# Patient Record
Sex: MALE | Race: WHITE | NOT HISPANIC OR LATINO | Employment: OTHER | URBAN - METROPOLITAN AREA
[De-identification: names, ages, dates, MRNs, and addresses within clinical notes are randomized per-mention and may not be internally consistent; named-entity substitution may affect disease eponyms.]

---

## 2024-01-03 ENCOUNTER — OFFICE VISIT (OUTPATIENT)
Dept: PRIMARY CARE | Facility: CLINIC | Age: 84
End: 2024-01-03
Payer: MEDICARE

## 2024-01-03 VITALS
WEIGHT: 255 LBS | HEART RATE: 77 BPM | BODY MASS INDEX: 38.65 KG/M2 | SYSTOLIC BLOOD PRESSURE: 150 MMHG | DIASTOLIC BLOOD PRESSURE: 82 MMHG | OXYGEN SATURATION: 97 % | HEIGHT: 68 IN

## 2024-01-03 DIAGNOSIS — E11.40 TYPE 2 DIABETES MELLITUS WITH DIABETIC NEUROPATHY, WITHOUT LONG-TERM CURRENT USE OF INSULIN (MULTI): ICD-10-CM

## 2024-01-03 DIAGNOSIS — I10 BENIGN ESSENTIAL HYPERTENSION: Primary | ICD-10-CM

## 2024-01-03 DIAGNOSIS — E78.2 MIXED HYPERLIPIDEMIA: ICD-10-CM

## 2024-01-03 DIAGNOSIS — E13.69 OTHER SPECIFIED DIABETES MELLITUS WITH OTHER SPECIFIED COMPLICATION, UNSPECIFIED WHETHER LONG TERM INSULIN USE (MULTI): ICD-10-CM

## 2024-01-03 PROBLEM — C44.41 BASAL CELL CARCINOMA (BCC) OF SKIN OF NECK: Status: ACTIVE | Noted: 2024-01-03

## 2024-01-03 PROBLEM — F51.01 PRIMARY INSOMNIA: Status: ACTIVE | Noted: 2024-01-03

## 2024-01-03 PROBLEM — G62.9 NEUROPATHY: Status: ACTIVE | Noted: 2024-01-03

## 2024-01-03 PROBLEM — E78.5 HYPERLIPIDEMIA: Status: ACTIVE | Noted: 2024-01-03

## 2024-01-03 PROCEDURE — 99214 OFFICE O/P EST MOD 30 MIN: CPT | Performed by: EMERGENCY MEDICINE

## 2024-01-03 PROCEDURE — 1036F TOBACCO NON-USER: CPT | Performed by: EMERGENCY MEDICINE

## 2024-01-03 PROCEDURE — 3079F DIAST BP 80-89 MM HG: CPT | Performed by: EMERGENCY MEDICINE

## 2024-01-03 PROCEDURE — 3077F SYST BP >= 140 MM HG: CPT | Performed by: EMERGENCY MEDICINE

## 2024-01-03 PROCEDURE — 1126F AMNT PAIN NOTED NONE PRSNT: CPT | Performed by: EMERGENCY MEDICINE

## 2024-01-03 RX ORDER — ASPIRIN 81 MG/1
81 TABLET ORAL DAILY
COMMUNITY

## 2024-01-03 RX ORDER — METFORMIN HYDROCHLORIDE 500 MG/1
500 TABLET, EXTENDED RELEASE ORAL
COMMUNITY

## 2024-01-03 RX ORDER — ATENOLOL 25 MG/1
TABLET ORAL DAILY
COMMUNITY

## 2024-01-03 RX ORDER — INSULIN GLARGINE 100 [IU]/ML
55 INJECTION, SOLUTION SUBCUTANEOUS EVERY 24 HOURS
COMMUNITY
End: 2024-01-03 | Stop reason: SDUPTHER

## 2024-01-03 RX ORDER — GLIMEPIRIDE 4 MG/1
4 TABLET ORAL
COMMUNITY

## 2024-01-03 RX ORDER — AMLODIPINE BESYLATE 10 MG/1
TABLET ORAL DAILY
COMMUNITY

## 2024-01-03 RX ORDER — LISINOPRIL 20 MG/1
20 TABLET ORAL DAILY
COMMUNITY

## 2024-01-03 RX ORDER — SIMVASTATIN 20 MG/1
20 TABLET, FILM COATED ORAL NIGHTLY
COMMUNITY

## 2024-01-03 RX ORDER — INSULIN GLARGINE 100 [IU]/ML
55 INJECTION, SOLUTION SUBCUTANEOUS EVERY 24 HOURS
Qty: 10 ML | Refills: 3 | Status: SHIPPED | OUTPATIENT
Start: 2024-01-03

## 2024-01-03 RX ORDER — PANTOPRAZOLE SODIUM 40 MG/1
40 TABLET, DELAYED RELEASE ORAL
COMMUNITY

## 2024-01-03 NOTE — PROGRESS NOTES
Subjective   Patient ID: Narciso Dao is a 83 y.o. male who presents for Establish Care.    Assessment/Plan   Problem List Items Addressed This Visit       Benign essential hypertension - Primary    Hyperlipidemia    Type 2 diabetes mellitus (CMS/formerly [facility])     Other Visit Diagnoses       Other specified diabetes mellitus with other specified complication, unspecified whether long term insulin use (CMS/formerly [facility])              Diabetes type 2- controlled on metformin 500mg, glimepiride and insulin. Will continue the same and check A1c at next visit.     Hypertension- on atenolol 25mg, lisinopril 20mg, and Norvasc 10mg. Legs are mildly swollen bilaterally, will decrease norvasc.      Hyperlipidemia- Zocor 20mg     Labs prior to next visit     Follow up in 5 months or sooner as needed     Source of history: Nurse, Medical personnel, Medical record, Patient.  History limitation: None.    HPI  83 y.o. male here to establish [facility]   Previous  provider moved locations.   No acute complaints.     Past medical history includes diabetes type 2 with neuropathy, hypertension, hyperlipidemia, insomnia, and basal cell carcinoma.     Not on File    Current Outpatient Medications   Medication Sig Dispense Refill    amLODIPine (Norvasc) 10 mg tablet Take by mouth once daily.      aspirin 81 mg EC tablet Take 1 tablet (81 mg) by mouth once daily.      atenolol (Tenormin) 25 mg tablet Take by mouth once daily.      glimepiride (Amaryl) 4 mg tablet Take 1 tablet (4 mg) by mouth once daily in the morning. Take before meals.      insulin glargine (Lantus U-100 Insulin) 100 unit/mL injection Inject 55 Units under the skin once every 24 hours. Take as directed per insulin instructions.      lisinopril 20 mg tablet Take 1 tablet (20 mg) by mouth once daily.      magnesium hydroxide (MAGNESIA ORAL) Take by mouth.      metFORMIN  mg 24 hr tablet Take 1 tablet (500 mg) by mouth once daily in the evening. Take with meals. Do not crush, chew, or  "split.      pantoprazole (ProtoNix) 40 mg EC tablet Take 1 tablet (40 mg) by mouth once daily in the morning. Take before meals. Do not crush, chew, or split.      simvastatin (Zocor) 20 mg tablet Take 1 tablet (20 mg) by mouth once daily at bedtime.       No current facility-administered medications for this visit.       Objective   Visit Vitals  /82   Pulse 77   Ht 1.727 m (5' 8\")   Wt 116 kg (255 lb)   SpO2 97%   BMI 38.77 kg/m²   Smoking Status Former   BSA 2.36 m²     Physical Exam  Vital signs as per nursing/MA documentation   General appearance: Alert and in no acute distress  HEENT: Normal Inspection   Neck: Normal Inspection   Respiratory: No respiratory distress Lungs are clear   Cardiovascular: Heart rate normal. No gallop  Back: Normal Inspection   Skin inspection: Warm   Musculoskeletal: No deformities   Neuro: Limited exam. Baseline    Review of Systems   Comprehensive review of systems as allowed by patient condition and nursing input is negative    No visits with results within 4 Month(s) from this visit.   Latest known visit with results is:   Legacy Encounter on 02/02/2023   Component Date Value Ref Range Status    Glucose 02/02/2023 150 (H)  74 - 99 mg/dL Final    Sodium 02/02/2023 140  136 - 145 mmol/L Final    Potassium 02/02/2023 4.7  3.5 - 5.3 mmol/L Final    Chloride 02/02/2023 104  98 - 107 mmol/L Final    Bicarbonate 02/02/2023 26  21 - 32 mmol/L Final    Anion Gap 02/02/2023 15  10 - 20 mmol/L Final    Urea Nitrogen 02/02/2023 21  6 - 23 mg/dL Final    Creatinine 02/02/2023 1.48 (H)  0.50 - 1.30 mg/dL Final    GFR MALE 02/02/2023 47 (A)  >90 mL/min/1.73m2 Final    Calcium 02/02/2023 9.9  8.6 - 10.6 mg/dL Final    Hemoglobin A1C 02/02/2023 8.3 (A)  % Final    Estimated Average Glucose 02/02/2023 192  MG/DL Final       Radiology: Reviewed imaging in powerchart.  No results found.    No family history on file.  Social History     Socioeconomic History    Marital status:      " Spouse name: None    Number of children: None    Years of education: None    Highest education level: None   Occupational History    None   Tobacco Use    Smoking status: Former     Types: Cigarettes    Smokeless tobacco: Never   Substance and Sexual Activity    Alcohol use: Yes    Drug use: None    Sexual activity: None   Other Topics Concern    None   Social History Narrative    None     Social Determinants of Health     Financial Resource Strain: Not on file   Food Insecurity: Not on file   Transportation Needs: Not on file   Physical Activity: Not on file   Stress: Not on file   Social Connections: Not on file   Intimate Partner Violence: Not on file   Housing Stability: Not on file     Past Medical History:   Diagnosis Date    Other specified soft tissue disorders 05/04/2017    Nodule of soft tissue     History reviewed. No pertinent surgical history.    Scribe Attestation  By signing my name below, Rand MENDOZA Scribe   attest that this documentation has been prepared under the direction and in the presence of Nba Adasm MD.

## 2024-02-22 DIAGNOSIS — E13.69 OTHER SPECIFIED DIABETES MELLITUS WITH OTHER SPECIFIED COMPLICATION, UNSPECIFIED WHETHER LONG TERM INSULIN USE (MULTI): ICD-10-CM

## 2024-06-10 LAB
NON-UH HIE A/G RATIO: 1.1
NON-UH HIE ALB: 4 G/DL (ref 3.4–5)
NON-UH HIE ALK PHOS: 179 UNIT/L (ref 45–117)
NON-UH HIE BASO COUNT: 0.08 X1000 (ref 0–0.2)
NON-UH HIE BASOS %: 0.7 %
NON-UH HIE BILIRUBIN, TOTAL: 0.4 MG/DL (ref 0.3–1.2)
NON-UH HIE BUN/CREAT RATIO: 14.4
NON-UH HIE BUN: 23 MG/DL (ref 9–23)
NON-UH HIE CALCIUM: 10 MG/DL (ref 8.7–10.4)
NON-UH HIE CALCULATED LDL CHOLESTEROL: 27 MG/DL (ref 60–130)
NON-UH HIE CALCULATED OSMOLALITY: 285 MOSM/KG (ref 275–295)
NON-UH HIE CHLORIDE: 105 MMOL/L (ref 98–107)
NON-UH HIE CHOLESTEROL: 143 MG/DL (ref 100–200)
NON-UH HIE CO2, VENOUS: 26 MMOL/L (ref 20–31)
NON-UH HIE CREATININE: 1.6 MG/DL (ref 0.6–1.1)
NON-UH HIE DIFF?: NO
NON-UH HIE EOS COUNT: 0.15 X1000 (ref 0–0.5)
NON-UH HIE EOSIN %: 1.3 %
NON-UH HIE GFR AA: 50
NON-UH HIE GLOBULIN: 3.8 G/DL
NON-UH HIE GLOMERULAR FILTRATION RATE: 41 ML/MIN/1.73M?
NON-UH HIE GLUCOSE: 169 MG/DL (ref 74–106)
NON-UH HIE GOT: 21 UNIT/L (ref 15–37)
NON-UH HIE GPT: 21 UNIT/L (ref 10–49)
NON-UH HIE HCT: 46.4 % (ref 41–52)
NON-UH HIE HDL CHOLESTEROL: 43 MG/DL (ref 40–60)
NON-UH HIE HGB A1C: 7.8 %
NON-UH HIE HGB: 15.2 G/DL (ref 13.5–17.5)
NON-UH HIE INSTR WBC: 11.1
NON-UH HIE K: 5.3 MMOL/L (ref 3.5–5.1)
NON-UH HIE LYMPH %: 30.9 %
NON-UH HIE LYMPH COUNT: 3.43 X1000 (ref 1.2–4.8)
NON-UH HIE MCH: 28.4 PG (ref 27–34)
NON-UH HIE MCHC: 32.7 G/DL (ref 32–37)
NON-UH HIE MCV: 86.6 FL (ref 80–100)
NON-UH HIE MONO %: 7.8 %
NON-UH HIE MONO COUNT: 0.87 X1000 (ref 0.1–1)
NON-UH HIE MPV: 8.8 FL (ref 7.4–10.4)
NON-UH HIE NA: 139 MMOL/L (ref 135–145)
NON-UH HIE NEUTROPHIL %: 59.2 %
NON-UH HIE NEUTROPHIL COUNT (ANC): 6.57 X1000 (ref 1.4–8.8)
NON-UH HIE NUCLEATED RBC: 0 /100WBC
NON-UH HIE PLATELET: 307 X10 (ref 150–450)
NON-UH HIE RBC: 5.36 X10 (ref 4.7–6.1)
NON-UH HIE RDW: 14.4 % (ref 11.5–14.5)
NON-UH HIE TOTAL CHOL/HDL CHOL RATIO: 3.3
NON-UH HIE TOTAL PROTEIN: 7.8 G/DL (ref 5.7–8.2)
NON-UH HIE TRIGLYCERIDES: 363 MG/DL (ref 30–150)
NON-UH HIE TSH: 4.45 UIU/ML (ref 0.55–4.78)
NON-UH HIE WBC: 11.1 X10 (ref 4.5–11)

## 2024-06-12 ENCOUNTER — APPOINTMENT (OUTPATIENT)
Dept: DERMATOLOGY | Facility: CLINIC | Age: 84
End: 2024-06-12
Payer: MEDICARE

## 2024-06-12 DIAGNOSIS — C44.219 BASAL CELL CARCINOMA (BCC) OF SKIN OF LEFT EAR: ICD-10-CM

## 2024-06-12 NOTE — LETTER
[facility] Provider/Referral Letter Treatment Plan    Patient: Narciso Dao   YOB: 1940   Date of Visit: 6/12/2024   MRN: [id number]     Renetta Montague MD  [address] Mont[address] MD  [address]  [place] [address]    Dear Renetta Montague MD,     I had the pleasure of seeing Narciso Dao today in consultation at your request for evaluation and treatment of:  1. Basal cell carcinoma (BCC) of skin of left ear  Left Superior [place]    Mohs surgery    Staff Communication: Dermatology Local Anesthesia: 1 % Lidocaine / Epinephrine - Amount: 6cc      Mohs surgery was indicated because of the nature of the lesion and the need to obtain the highest cure rate.  After informed consent was obtained, the patient underwent the procedure without complication.    The skin cancer was removed, wound care instructions were given and the patient was advised to follow up with you.  I will see the patient post-operatively as indicated.    Thank you very much for your confidence in me and for allowing me to share in the care of this patient.    1. Basal cell carcinoma (BCC) of skin of left ear  Left Superior [place]  Is a 1.2 x 1.6 cm scar                      Mohs surgery    Consent obtained: written    Universal Protocol:  Procedure explained and questions answered to patient or proxy's satisfaction: Yes    Test results available and properly labeled: Yes    Pathology report reviewed: Yes    External notes reviewed: Yes    Photo or diagram used for site identification: Yes    Site/side marked: Yes    Slide independently reviewed by Mohs surgeon: Yes    Immediately prior to procedure a time out was called: Yes    Patient identity confirmed: verbally with patient  Preparation: Patient was prepped and draped in usual sterile fashion      Anticoagulation:  Is the patient taking prescription anticoagulant and/or aspirin prescribed/recommended by a physician? Yes    Was the anticoagulation regimen changed prior to  Mohs? No      Anesthesia:  Anesthesia method: local infiltration  Local anesthetic: lidocaine 1% WITH epi    Procedure Details:  Biopsy accession number: [id number]  Date of biopsy: 4/16/2024  Frozen section biopsy performed: No    Specimen debulked: No    Pre-Op diagnosis: basal cell carcinoma  BCC subtype: nodular  Surgery side: left  Surgical site (from skin exam): Left Superior [place]  Pre-operative length (cm): 1.2  Pre-operative width (cm): 1.6  Indications for Mohs surgery: anatomic location where tissue conservation is critical    Micrographic Surgery Details:  Post-operative length (cm): 1.8  Post-operative width (cm): 1.6  Number of Mohs stages: 1    Stage 1     Comments: The patient was brought into the operating room and placed in the procedure chair in the appropriate position.  The area positive by previous biopsy was identified and confirmed with the patient. The area of clinically obvious tumor was debulked using a curette and/or scalpel as needed. An incision was made following the Mohs approach through the skin. The specimen was taken to the lab, divided into 2 piece(s) and appropriately chromacoded and processed.                 Tumor features identified on Mohs section: no tumor identified    Depth of defect: subcutaneous fat    Patient tolerance of procedure: tolerated well, no immediate complications    Reconstruction:  Was the defect reconstructed?: No    Fine/surface layer approximation (top stitches)   Hemostasis achieved with: electrodesiccation  Outcome: patient tolerated procedure well with no complications    Post-procedure details: sterile dressing applied and wound care instructions given    Dressing type: Gelfoam, petrolatum, pressure dressing, Telfa pad and Hypafix      Staff Communication: Dermatology Local Anesthesia: 1 % Lidocaine / Epinephrine - Amount: 6cc           Sincerely,       Enio Mcwilliams MD PhD  [facility]

## 2024-06-12 NOTE — PROGRESS NOTES
Office Visit Note  Date: 6/12/2024  Surgeon:  Enio Mcwilliams MD PhD  Office Location:  [address] [facility]  [address] [facility]  [address]  [facility] [address]  Dept: [phone]  Dept Fax: [phone]  Referring Provider: Renetta Montague MD  [address]  [address]  [place] [address]    Subjective   Narciso Dao is a 83 y.o. male who presents for the following: MOHS Surgery    According to the patient, the lesion has been present for approximately 6 months at the time of diagnosis.  The lesion is Bleeding.  The lesion has not been treated previously.    The patient does not have a pacemaker / defibrillator.  The patient does not have a heart valve / joint replacement.    The patient is on blood thinners.  The patient does not have a history of hepatitis B or C.  The patient does not have a history of HIV.  The patient does not have a history of immunosuppression (e.g. organ transplantation, malignancy, medications)    Review of Systems:  No other skin or systemic complaints other than what is documented elsewhere in the note.    MEDICAL HISTORY: clinically relevant history including significant past medical history, medications and allergies was reviewed and documented in Epic.    Objective   Well appearing patient in no apparent distress; mood and affect are within normal limits.  Vital signs: See record.  Noted on the Left Superior Helix  Is a 1.2 x 1.6 cm scar                  The patient confirmed the identified site.    Discussion:  The nature of the diagnosis was explained. The lesion is a skin cancer.  It has a risk of local growth and distant spread. The condition is associated with sun exposure.  Warning signs of non-melanoma skin cancer discussed. Patient was instructed to perform monthly self skin examination.  We recommended that the patient have regular full skin exams given an increased risk of subsequent skin cancers. The patient was instructed to use sun  protective behaviors including use of broad spectrum sunscreens and sun protective clothing to reduce risk of skin cancers.      Risks, benefits, side effects of Mohs surgery were discussed with patient and the patient voiced understanding.  It was explained that even though the cure rate of Mohs is very high it is not 100%. Risks of surgery including but not limited to bleeding, infection, numbness, nerve damage, and scar were reviewed.  Discussion included wound care requirements, activity restrictions, likely scar outcome and time to heal.     After Mohs surgery, the defect may need to be repaired surgically and the scar may be longer than the original lesion.  Reconstruction options, risks, and benefits were reviewed including second intention healing, linear repair (4-1 ratio was explained), local flaps, skin grafts, cartilage grafts and interpolation flaps (the need for multiple surgeries was explained). Possible outcomes were reviewed including likely scar appearance, failure of flap survival, infection, bleeding and the need for revision surgery.     The pathology was reviewed, the photograph was reviewed, and the referring physician's note was reviewed.    Patient elected for Mohs surgery.

## 2024-06-12 NOTE — PROGRESS NOTES
Spoke with patient and he says that he has an appt for this on 6/17/24. Will discuss this further with doctor

## 2024-06-12 NOTE — PROGRESS NOTES
Mohs Surgery Operative Note    Date of Surgery:  6/12/2024  Surgeon:  Enio Mcwilliams MD PhD  Office Location: [address]  [facility] [address]  Dept: [phone]  Dept Fax: [phone]  Referring Provider: Renetta Montague MD  [address]  [address]  [place] [address]      Assessment/Plan   Pre-procedure:   Obtained informed consent: written from patient  The surgical site was identified and confirmed with the patient.     Intra-operative:   Audible time out called at : 10:54 AM 06/12/24  by: Murali Bonilla MA   Verified patient name, birthdate, site, specimen bottle label & requisition.    The planned procedure(s) was again reviewed with the patient. The risks of bleeding, infection, nerve damage and scarring were reviewed. Written authorization was obtained. The patient identity, surgical site, and planned procedure(s) were verified. The provider acted as both surgeon and pathologist.     Basal cell carcinoma (BCC) of skin of left ear  Left Superior [place]    Mohs surgery    Consent obtained: written    Universal Protocol:  Procedure explained and questions answered to patient or proxy's satisfaction: Yes    Test results available and properly labeled: Yes    Pathology report reviewed: Yes    External notes reviewed: Yes    Photo or diagram used for site identification: Yes    Site/side marked: Yes    Slide independently reviewed by Mohs surgeon: Yes    Immediately prior to procedure a time out was called: Yes    Patient identity confirmed: verbally with patient  Preparation: Patient was prepped and draped in usual sterile fashion      Anticoagulation:  Is the patient taking prescription anticoagulant and/or aspirin prescribed/recommended by a physician? Yes    Was the anticoagulation regimen changed prior to Mohs? No      Anesthesia:  Anesthesia method: local infiltration  Local anesthetic: lidocaine 1% WITH epi    Procedure  Details:  Biopsy accession number: [id number]  Date of biopsy: 4/16/2024  Frozen section biopsy performed: No    Specimen debulked: No    Pre-Op diagnosis: basal cell carcinoma  BCC subtype: nodular  Surgery side: left  Surgical site (from skin exam): Left Superior [place]  Pre-operative length (cm): 1.2  Pre-operative width (cm): 1.6  Indications for Mohs surgery: anatomic location where tissue conservation is critical    Micrographic Surgery Details:  Post-operative length (cm): 1.8  Post-operative width (cm): 1.6  Number of Mohs stages: 1    Stage 1     Comments: The patient was brought into the operating room and placed in the procedure chair in the appropriate position.  The area positive by previous biopsy was identified and confirmed with the patient. The area of clinically obvious tumor was debulked using a curette and/or scalpel as needed. An incision was made following the Mohs approach through the skin. The specimen was taken to the lab, divided into 2 piece(s) and appropriately chromacoded and processed.    Tumor features identified on Mohs section: no tumor identified    Depth of defect: subcutaneous fat    Patient tolerance of procedure: tolerated well, no immediate complications    Reconstruction:  Was the defect reconstructed?: No    Fine/surface layer approximation (top stitches)   Hemostasis achieved with: electrodesiccation  Outcome: patient tolerated procedure well with no complications    Post-procedure details: sterile dressing applied and wound care instructions given    Dressing type: Gelfoam, petrolatum, pressure dressing, Telfa pad and Hypafix        Various closure modalities were discussed, and  +++ The patient selected that the wound would be allowed to heal by granulation. Additional risks of white scarring and slow healing time explained. I invited him to return if he is unsatisfied with the healing.    The final repair measured 1.8 x 1.6 cm    Wound care was discussed, and the patient  was given written post-operative wound care instructions.      The patient will follow up with Enio Mcwilliams MD PhD as needed for any post operative problems or concerns, and will follow up with their primary dermatologist as scheduled.

## 2024-06-17 ENCOUNTER — APPOINTMENT (OUTPATIENT)
Dept: PRIMARY CARE | Facility: CLINIC | Age: 84
End: 2024-06-17
Payer: MEDICARE

## 2024-06-17 VITALS
HEIGHT: 68 IN | BODY MASS INDEX: 37.01 KG/M2 | WEIGHT: 244.2 LBS | OXYGEN SATURATION: 98 % | SYSTOLIC BLOOD PRESSURE: 160 MMHG | HEART RATE: 70 BPM | DIASTOLIC BLOOD PRESSURE: 88 MMHG

## 2024-06-17 DIAGNOSIS — I15.9 SECONDARY HYPERTENSION: ICD-10-CM

## 2024-06-17 DIAGNOSIS — E78.2 MIXED HYPERLIPIDEMIA: ICD-10-CM

## 2024-06-17 DIAGNOSIS — K21.9 GASTROESOPHAGEAL REFLUX DISEASE, UNSPECIFIED WHETHER ESOPHAGITIS PRESENT: ICD-10-CM

## 2024-06-17 DIAGNOSIS — E11.40 TYPE 2 DIABETES MELLITUS WITH DIABETIC NEUROPATHY, WITHOUT LONG-TERM CURRENT USE OF INSULIN (MULTI): ICD-10-CM

## 2024-06-17 DIAGNOSIS — R60.9 EDEMA, UNSPECIFIED TYPE: Primary | ICD-10-CM

## 2024-06-17 PROCEDURE — 99213 OFFICE O/P EST LOW 20 MIN: CPT | Performed by: EMERGENCY MEDICINE

## 2024-06-17 PROCEDURE — 3079F DIAST BP 80-89 MM HG: CPT | Performed by: EMERGENCY MEDICINE

## 2024-06-17 PROCEDURE — 99397 PER PM REEVAL EST PAT 65+ YR: CPT | Performed by: EMERGENCY MEDICINE

## 2024-06-17 PROCEDURE — G0439 PPPS, SUBSEQ VISIT: HCPCS | Performed by: EMERGENCY MEDICINE

## 2024-06-17 PROCEDURE — 1036F TOBACCO NON-USER: CPT | Performed by: EMERGENCY MEDICINE

## 2024-06-17 PROCEDURE — 3077F SYST BP >= 140 MM HG: CPT | Performed by: EMERGENCY MEDICINE

## 2024-06-17 PROCEDURE — 99497 ADVNCD CARE PLAN 30 MIN: CPT | Performed by: EMERGENCY MEDICINE

## 2024-06-17 PROCEDURE — 1170F FXNL STATUS ASSESSED: CPT | Performed by: EMERGENCY MEDICINE

## 2024-06-17 RX ORDER — PANTOPRAZOLE SODIUM 40 MG/1
40 TABLET, DELAYED RELEASE ORAL
Qty: 90 TABLET | Refills: 3 | Status: SHIPPED | OUTPATIENT
Start: 2024-06-17

## 2024-06-17 RX ORDER — AMLODIPINE BESYLATE 10 MG/1
5 TABLET ORAL DAILY
Qty: 45 TABLET | Refills: 3 | Status: SHIPPED | OUTPATIENT
Start: 2024-06-17

## 2024-06-17 RX ORDER — LISINOPRIL 20 MG/1
20 TABLET ORAL DAILY
Qty: 90 TABLET | Refills: 3 | Status: SHIPPED | OUTPATIENT
Start: 2024-06-17

## 2024-06-17 RX ORDER — ATENOLOL 25 MG/1
25 TABLET ORAL DAILY
Qty: 90 TABLET | Refills: 3 | Status: SHIPPED | OUTPATIENT
Start: 2024-06-17

## 2024-06-17 RX ORDER — METFORMIN HYDROCHLORIDE 500 MG/1
500 TABLET, EXTENDED RELEASE ORAL 3 TIMES DAILY
Qty: 270 TABLET | Refills: 3 | Status: SHIPPED | OUTPATIENT
Start: 2024-06-17

## 2024-06-17 RX ORDER — SPIRONOLACTONE 50 MG/1
50 TABLET, FILM COATED ORAL DAILY
Qty: 10 TABLET | Refills: 1 | Status: SHIPPED | OUTPATIENT
Start: 2024-06-17 | End: 2024-06-27

## 2024-06-17 RX ORDER — GLIMEPIRIDE 4 MG/1
4 TABLET ORAL
Qty: 90 TABLET | Refills: 3 | Status: SHIPPED | OUTPATIENT
Start: 2024-06-17

## 2024-06-17 RX ORDER — SIMVASTATIN 20 MG/1
20 TABLET, FILM COATED ORAL NIGHTLY
Qty: 90 TABLET | Refills: 3 | Status: SHIPPED | OUTPATIENT
Start: 2024-06-17

## 2024-06-17 RX ORDER — TORSEMIDE 20 MG/1
20 TABLET ORAL DAILY
Qty: 10 TABLET | Refills: 1 | Status: SHIPPED | OUTPATIENT
Start: 2024-06-17 | End: 2024-06-27

## 2024-06-17 ASSESSMENT — PATIENT HEALTH QUESTIONNAIRE - PHQ9
1. LITTLE INTEREST OR PLEASURE IN DOING THINGS: NOT AT ALL
2. FEELING DOWN, DEPRESSED OR HOPELESS: NOT AT ALL
SUM OF ALL RESPONSES TO PHQ9 QUESTIONS 1 AND 2: 0

## 2024-06-17 ASSESSMENT — ACTIVITIES OF DAILY LIVING (ADL)
DRESSING: INDEPENDENT
BATHING: INDEPENDENT
MANAGING_FINANCES: INDEPENDENT
TAKING_MEDICATION: INDEPENDENT
DOING_HOUSEWORK: INDEPENDENT
GROCERY_SHOPPING: INDEPENDENT

## 2024-06-17 ASSESSMENT — ENCOUNTER SYMPTOMS
LOSS OF SENSATION IN FEET: 0
DEPRESSION: 0
OCCASIONAL FEELINGS OF UNSTEADINESS: 0

## 2024-06-17 NOTE — PROGRESS NOTES
Subjective   Patient ID: Narciso Dao is a 83 y.o. male who presents for Follow-up.    Assessment/Plan   Problem List Items Addressed This Visit       Hyperlipidemia    Relevant Medications    simvastatin (Zocor) 20 mg tablet    Type 2 diabetes mellitus (Multi)    Relevant Medications    glimepiride (Amaryl) 4 mg tablet    metFORMIN  mg 24 hr tablet     Other Visit Diagnoses       Edema, unspecified type    -  Primary    Relevant Medications    torsemide (Demadex) 20 mg tablet    spironolactone (Aldactone) 50 mg tablet    Gastroesophageal reflux disease, unspecified whether esophagitis present        Relevant Medications    pantoprazole (ProtoNix) 40 mg EC tablet    Secondary hypertension        Relevant Medications    amLODIPine (Norvasc) 10 mg tablet    atenolol (Tenormin) 25 mg tablet    lisinopril 20 mg tablet          Medicare wellness, physical and office visit    Diabetes type 2- controlled on metformin 500mg, glimepiride and insulin. Will continue the same and check A1c at next visit. A1c today is 7.8.    Hypertension- on atenolol 25mg, lisinopril 20mg, and Norvasc 10mg. Legs are mildly swollen bilaterally. Increase Lisinopril to 40 mg. Discontinue Norvasc 10 mg. Start taking Torsemide and Spironolactone as needed for leg swelling.    Hyperlipidemia- Continue Zocor 20mg     I discussed advanced care planning for more than 16 minutes including the explanation and discussion of advanced directives. If patient does not have current up to date documents, examples and information provided on how to create both living will and power of . Patient was encouraged to work on completing these documents.  Information and advise was also provided on DO NOT RESUSCITATE and patient encouraged to consider this  Patient is not sure about DNR at this time.      Labs prior to next visit     Follow up in 6 months or sooner as needed     Source of history: Nurse, Medical personnel, Medical record,  "Patient.  History limitation: None.    Medicare wellness, physical and office visit    83 y.o. male   No acute complaints.     Past medical history includes diabetes type 2 with neuropathy, hypertension, hyperlipidemia, insomnia, and basal cell carcinoma.     No Known Allergies    Current Outpatient Medications   Medication Sig Dispense Refill    aspirin 81 mg EC tablet Take 1 tablet (81 mg) by mouth once daily.      insulin glargine (Lantus U-100 Insulin) 100 unit/mL injection Inject 55 Units under the skin once every 24 hours. Inject 55 units daily (Patient taking differently: Inject 55 Units under the skin once every 24 hours. Inject 55-60 units daily) 10 mL 3    magnesium hydroxide (MAGNESIA ORAL) Take by mouth.      amLODIPine (Norvasc) 10 mg tablet Take 0.5 tablets (5 mg) by mouth once daily. 45 tablet 3    atenolol (Tenormin) 25 mg tablet Take 1 tablet (25 mg) by mouth once daily. 90 tablet 3    glimepiride (Amaryl) 4 mg tablet Take 1 tablet (4 mg) by mouth once daily in the morning. Take before meals. 90 tablet 3    lisinopril 20 mg tablet Take 1 tablet (20 mg) by mouth once daily. 90 tablet 3    metFORMIN  mg 24 hr tablet Take 1 tablet (500 mg) by mouth 3 times a day. Do not crush, chew, or split. 270 tablet 3    pantoprazole (ProtoNix) 40 mg EC tablet Take 1 tablet (40 mg) by mouth once daily in the morning. Take before meals. Do not crush, chew, or split. 90 tablet 3    simvastatin (Zocor) 20 mg tablet Take 1 tablet (20 mg) by mouth once daily at bedtime. 90 tablet 3    spironolactone (Aldactone) 50 mg tablet Take 1 tablet (50 mg) by mouth once daily for 10 days. 10 tablet 1    torsemide (Demadex) 20 mg tablet Take 1 tablet (20 mg) by mouth once daily for 10 days. 10 tablet 1     No current facility-administered medications for this visit.       Objective   Visit Vitals  /88   Pulse 70   Ht 1.715 m (5' 7.5\")   Wt 111 kg (244 lb 3.2 oz)   SpO2 98%   BMI 37.68 kg/m²   Smoking Status Former   BSA " 2.3 m²     Physical Exam  Vital signs as per nursing/MA documentation   General appearance: Alert and in no acute distress  HEENT: Normal Inspection   Neck: Normal Inspection   Respiratory: No respiratory distress Lungs are clear   Cardiovascular: Heart rate normal. No gallop  Back: Normal Inspection   Skin inspection: Warm   Musculoskeletal: No deformities. Bilateral pitting edema on legs. R side 2+, L side 1+.  Neuro: Limited exam. Baseline    Review of Systems   Comprehensive review of systems as allowed by patient condition and nursing input is negative    Orders Only on 06/10/2024   Component Date Value Ref Range Status    [facility] MCV 06/10/2024 86.6  80.0 - 100.0 fL Final    [facility] MPV 06/10/2024 8.8  7.4 - 10.4 fL Final    [facility] HGB 06/10/2024 15.2  13.5 - 17.5 g/dL Final    [facility] WBC 06/10/2024 11.1 (H)  4.5 - 11.0 x10 Final    [facility] RDW 06/10/2024 14.4  11.5 - 14.5 % Final    [facility] MCH 06/10/2024 28.4  27.0 - 34.0 pg Final    [facility] Nucleated RBC 06/10/2024 0  /100WBC Final    [facility] HCT 06/10/2024 46.4  41.0 - 52.0 % Final    [facility] RBC 06/10/2024 5.36  4.70 - 6.10 x10 Final    [facility] Platelet 06/10/2024 307  150 - 450 x10 Final    [facility] Instr WBC 06/10/2024 11.1   Final    [facility] MCHC 06/10/2024 32.7  32.0 - 37.0 g/dL Final    [facility] DIFF? 06/10/2024 No   Final    [facility] Mono Count 06/10/2024 0.87  0.10 - 1.00 x1000 Final    [facility] Neutrophil Count (ANC) 06/10/2024 6.57  1.40 - 8.80 x1000 Final    [facility] Neutrophil % 06/10/2024 59.2  % Final    [facility] Eosin % 06/10/2024 1.3  % Final    [facility] Eos Count 06/10/2024 0.15  0.00 - 0.50 x1000 Final    [facility] Lymph Count 06/10/2024 3.43  1.20 - 4.80 x1000 Final    [facility] Lymph % 06/10/2024 30.9  % Final    [facility] Baso Count 06/10/2024 0.08  0.00 - 0.20 x1000 Final    [facility] Basos % 06/10/2024 0.7  % Final    [facility] Mono % 06/10/2024 7.8  % Final    [facility] TSH  06/10/2024 4.45  0.55 - 4.78 uIU/ml Final    [facility] Na 06/10/2024 139  135 - 145 mmol/L Final    [facility] Glomerular Filtration R* 06/10/2024 41  mL/min/1.73m? Final    [facility] Calculated Osmolality 06/10/2024 285  275 - 295 mOsm/kg Final    [facility] Alk Phos 06/10/2024 179 (H)  45 - 117 unit/L Final    [facility] Globulin 06/10/2024 3.8  g/dL Final    [facility] Calcium 06/10/2024 10.0  8.7 - 10.4 mg/dL Final    [facility] GOT 06/10/2024 21  15 - 37 unit/L Final    [facility] Glucose 06/10/2024 169 (H)  74 - 106 mg/dL Final    [facility] K 06/10/2024 5.3 (H)  3.5 - 5.1 mmol/L Final    [facility] GFR AA 06/10/2024 50   Final    [facility] Bilirubin, Total 06/10/2024 0.40  0.30 - 1.20 mg/dL Final    [facility] BUN 06/10/2024 23  9 - 23 mg/dL Final    [facility] A/G Ratio 06/10/2024 1.1   Final    [facility] BUN/Creat Ratio 06/10/2024 14.4   Final    [facility] ALB 06/10/2024 4.0  3.4 - 5.0 g/dL Final    [facility] Creatinine 06/10/2024 1.6 (H)  0.6 - 1.1 mg/dL Final    [facility] GPT 06/10/2024 21  10 - 49 unit/L Final    [facility] Chloride 06/10/2024 105  98 - 107 mmol/L Final    [facility] Total Protein 06/10/2024 7.8  5.7 - 8.2 g/dL Final    [facility] CO2, venous 06/10/2024 26.0  20.0 - 31.0 mmol/L Final    [facility] HDL Cholesterol 06/10/2024 43  40 - 60 mg/dL Final    [facility] Total Chol/HDL Chol Rat* 06/10/2024 3.3   Final    [facility] Triglycerides 06/10/2024 363 (H)  30 - 150 mg/dL Final    [facility] Cholesterol 06/10/2024 143  100 - 200 mg/dL Final    [facility] Calculated LDL Choleste* 06/10/2024 27 (L)  60 - 130 mg/dL Final    [facility] HGB A1C 06/10/2024 7.8  % Final       Radiology: Reviewed imaging in powerchart.  No results found.    No family history on file.  Social History     Socioeconomic History    Marital status:      Spouse name: None    Number of children: None    Years of education: None    Highest education level: None   Occupational History    None   Tobacco Use     Smoking status: Former     Types: Cigarettes    Smokeless tobacco: Never   Vaping Use    Vaping status: Never Used   Substance and Sexual Activity    Alcohol use: Yes    Drug use: Never    Sexual activity: None   Other Topics Concern    None   Social History Narrative    None     Social Determinants of Health     Financial Resource Strain: Not on file   Food Insecurity: Not on file   Transportation Needs: Not on file   Physical Activity: Not on file   Stress: Not on file   Social Connections: Not on file   Intimate Partner Violence: Not on file   Housing Stability: Not on file     Past Medical History:   Diagnosis Date    Other specified soft tissue disorders 05/04/2017    Nodule of soft tissue     History reviewed. No pertinent surgical history.    Scribe Attestation  By signing my name below, I, Nba Adams MD , PA-S  attest that this documentation has been prepared under the direction and in the presence of Nba Adams MD.

## 2025-01-06 ENCOUNTER — APPOINTMENT (OUTPATIENT)
Dept: PRIMARY CARE | Facility: CLINIC | Age: 85
End: 2025-01-06
Payer: MEDICARE

## 2025-01-06 VITALS
OXYGEN SATURATION: 98 % | DIASTOLIC BLOOD PRESSURE: 92 MMHG | BODY MASS INDEX: 38.55 KG/M2 | WEIGHT: 254.38 LBS | SYSTOLIC BLOOD PRESSURE: 182 MMHG | HEART RATE: 74 BPM | HEIGHT: 68 IN

## 2025-01-06 DIAGNOSIS — E11.40 TYPE 2 DIABETES MELLITUS WITH DIABETIC NEUROPATHY, WITHOUT LONG-TERM CURRENT USE OF INSULIN: ICD-10-CM

## 2025-01-06 DIAGNOSIS — Z00.00 WELLNESS EXAMINATION: Primary | ICD-10-CM

## 2025-01-06 DIAGNOSIS — E66.01 OBESITY, MORBID (MULTI): ICD-10-CM

## 2025-01-06 DIAGNOSIS — E78.2 MIXED HYPERLIPIDEMIA: ICD-10-CM

## 2025-01-06 DIAGNOSIS — Z23 FLU VACCINE NEED: ICD-10-CM

## 2025-01-06 DIAGNOSIS — E13.69 OTHER SPECIFIED DIABETES MELLITUS WITH OTHER SPECIFIED COMPLICATION, UNSPECIFIED WHETHER LONG TERM INSULIN USE (MULTI): ICD-10-CM

## 2025-01-06 DIAGNOSIS — K21.9 GASTROESOPHAGEAL REFLUX DISEASE, UNSPECIFIED WHETHER ESOPHAGITIS PRESENT: ICD-10-CM

## 2025-01-06 DIAGNOSIS — I15.9 SECONDARY HYPERTENSION: ICD-10-CM

## 2025-01-06 PROCEDURE — 3080F DIAST BP >= 90 MM HG: CPT | Performed by: EMERGENCY MEDICINE

## 2025-01-06 PROCEDURE — 1036F TOBACCO NON-USER: CPT | Performed by: EMERGENCY MEDICINE

## 2025-01-06 PROCEDURE — 99213 OFFICE O/P EST LOW 20 MIN: CPT | Performed by: EMERGENCY MEDICINE

## 2025-01-06 PROCEDURE — 1159F MED LIST DOCD IN RCRD: CPT | Performed by: EMERGENCY MEDICINE

## 2025-01-06 PROCEDURE — 3077F SYST BP >= 140 MM HG: CPT | Performed by: EMERGENCY MEDICINE

## 2025-01-06 PROCEDURE — G0444 DEPRESSION SCREEN ANNUAL: HCPCS | Performed by: EMERGENCY MEDICINE

## 2025-01-06 PROCEDURE — 99397 PER PM REEVAL EST PAT 65+ YR: CPT | Performed by: EMERGENCY MEDICINE

## 2025-01-06 PROCEDURE — 99497 ADVNCD CARE PLAN 30 MIN: CPT | Performed by: EMERGENCY MEDICINE

## 2025-01-06 PROCEDURE — G0439 PPPS, SUBSEQ VISIT: HCPCS | Performed by: EMERGENCY MEDICINE

## 2025-01-06 PROCEDURE — 1170F FXNL STATUS ASSESSED: CPT | Performed by: EMERGENCY MEDICINE

## 2025-01-06 PROCEDURE — 1123F ACP DISCUSS/DSCN MKR DOCD: CPT | Performed by: EMERGENCY MEDICINE

## 2025-01-06 PROCEDURE — G0442 ANNUAL ALCOHOL SCREEN 15 MIN: HCPCS | Performed by: EMERGENCY MEDICINE

## 2025-01-06 RX ORDER — METFORMIN HYDROCHLORIDE 500 MG/1
500 TABLET, EXTENDED RELEASE ORAL 3 TIMES DAILY
Qty: 270 TABLET | Refills: 3 | Status: SHIPPED | OUTPATIENT
Start: 2025-01-06

## 2025-01-06 RX ORDER — PANTOPRAZOLE SODIUM 40 MG/1
40 TABLET, DELAYED RELEASE ORAL
Qty: 90 TABLET | Refills: 3 | Status: SHIPPED | OUTPATIENT
Start: 2025-01-06

## 2025-01-06 RX ORDER — INSULIN GLARGINE 100 [IU]/ML
55 INJECTION, SOLUTION SUBCUTANEOUS EVERY 24 HOURS
Qty: 18 ML | Refills: 3 | Status: SHIPPED | OUTPATIENT
Start: 2025-01-06

## 2025-01-06 RX ORDER — GLIMEPIRIDE 4 MG/1
4 TABLET ORAL
Qty: 90 TABLET | Refills: 3 | Status: SHIPPED | OUTPATIENT
Start: 2025-01-06

## 2025-01-06 RX ORDER — SIMVASTATIN 20 MG/1
20 TABLET, FILM COATED ORAL NIGHTLY
Qty: 90 TABLET | Refills: 3 | Status: SHIPPED | OUTPATIENT
Start: 2025-01-06

## 2025-01-06 RX ORDER — LISINOPRIL 20 MG/1
20 TABLET ORAL DAILY
Qty: 90 TABLET | Refills: 3 | Status: SHIPPED | OUTPATIENT
Start: 2025-01-06

## 2025-01-06 RX ORDER — ATENOLOL 25 MG/1
25 TABLET ORAL DAILY
Qty: 90 TABLET | Refills: 3 | Status: SHIPPED | OUTPATIENT
Start: 2025-01-06

## 2025-01-06 ASSESSMENT — PATIENT HEALTH QUESTIONNAIRE - PHQ9
2. FEELING DOWN, DEPRESSED OR HOPELESS: NOT AT ALL
1. LITTLE INTEREST OR PLEASURE IN DOING THINGS: NOT AT ALL
SUM OF ALL RESPONSES TO PHQ9 QUESTIONS 1 AND 2: 0
SUM OF ALL RESPONSES TO PHQ9 QUESTIONS 1 AND 2: 0
1. LITTLE INTEREST OR PLEASURE IN DOING THINGS: NOT AT ALL
2. FEELING DOWN, DEPRESSED OR HOPELESS: NOT AT ALL

## 2025-01-06 ASSESSMENT — ACTIVITIES OF DAILY LIVING (ADL)
DRESSING: INDEPENDENT
DOING_HOUSEWORK: INDEPENDENT
BATHING: INDEPENDENT
MANAGING_FINANCES: INDEPENDENT
GROCERY_SHOPPING: INDEPENDENT
TAKING_MEDICATION: INDEPENDENT

## 2025-01-06 NOTE — PROGRESS NOTES
Subjective   Patient ID: Narciso Dao is a 84 y.o. male who presents for Medicare Annual Wellness Visit Subsequent.    Assessment/Plan   Problem List Items Addressed This Visit       Hyperlipidemia    Relevant Medications    simvastatin (Zocor) 20 mg tablet    Type 2 diabetes mellitus    Relevant Medications    glimepiride (Amaryl) 4 mg tablet    metFORMIN  mg 24 hr tablet     Other Visit Diagnoses       Other specified diabetes mellitus with other specified complication, unspecified whether long term insulin use (Multi)        Relevant Medications    insulin glargine (Lantus U-100 Insulin) 100 unit/mL injection    Other Relevant Orders    POCT glycosylated hemoglobin (Hb A1C) manually resulted    Flu vaccine need        Secondary hypertension        Relevant Medications    atenolol (Tenormin) 25 mg tablet    lisinopril 20 mg tablet    Gastroesophageal reflux disease, unspecified whether esophagitis present        Relevant Medications    pantoprazole (ProtoNix) 40 mg EC tablet          Medicare wellness, physical and office visit    Diabetes type 2- controlled on metformin 500mg, glimepiride and insulin. Will continue the same and check A1c at next visit.  Check hemoglobin A1c    Hypertension- on atenolol 25mg, lisinopril 20mg, and Norvasc 10mg.   Leg swelling is resolved.  Patient has resumed taking 5 mg of Norvasc.  Will increase it to 10 mg daily in view of suboptimal control.  There is also room on lisinopril and atenolol to go up on the dose    Hyperlipidemia- Continue Zocor 20mg     I discussed advanced care planning for more than 16 minutes including the explanation and discussion of advanced directives. If patient does not have current up to date documents, examples and information provided on how to create both living will and power of . Patient was encouraged to work on completing these documents.  Information and advise was also provided on DO NOT RESUSCITATE and patient encouraged to  consider this  Patient is not sure about DNR at this time.      Preventative care-wants to take pneumonia shot at next visit  Does not qualify for any other preventive care in view of his age      PH Q-9 depression screening was completed by authorized employee of the practice for 5-10 minutes and  explained the questionnaire and discussed the answers with the patient.    Alcohol screening was completed for 5 to 10 minutes      Labs prior to next visit     Follow up in 6 months or sooner as needed     Source of history: Nurse, Medical personnel, Medical record, Patient.  History limitation: None.    Medicare wellness, physical and office visit    84 y.o. male   No acute complaints.     Past medical history includes diabetes type 2 with neuropathy, hypertension, hyperlipidemia, insomnia, and basal cell carcinoma.     No Known Allergies    Current Outpatient Medications   Medication Sig Dispense Refill    amLODIPine (Norvasc) 10 mg tablet Take 0.5 tablets (5 mg) by mouth once daily. 45 tablet 3    aspirin 81 mg EC tablet Take 1 tablet (81 mg) by mouth once daily.      magnesium hydroxide (MAGNESIA ORAL) Take by mouth.      atenolol (Tenormin) 25 mg tablet Take 1 tablet (25 mg) by mouth once daily. 90 tablet 3    glimepiride (Amaryl) 4 mg tablet Take 1 tablet (4 mg) by mouth once daily in the morning. Take before meals. 90 tablet 3    insulin glargine (Lantus U-100 Insulin) 100 unit/mL injection Inject 55 Units under the skin once every 24 hours. Inject 55-60 units daily 18 mL 3    lisinopril 20 mg tablet Take 1 tablet (20 mg) by mouth once daily. 90 tablet 3    metFORMIN  mg 24 hr tablet Take 1 tablet (500 mg) by mouth 3 times a day. Do not crush, chew, or split. 270 tablet 3    pantoprazole (ProtoNix) 40 mg EC tablet Take 1 tablet (40 mg) by mouth once daily in the morning. Take before meals. Do not crush, chew, or split. 90 tablet 3    simvastatin (Zocor) 20 mg tablet Take 1 tablet (20 mg) by mouth once daily  "at bedtime. 90 tablet 3    spironolactone (Aldactone) 50 mg tablet Take 1 tablet (50 mg) by mouth once daily for 10 days. 10 tablet 1    torsemide (Demadex) 20 mg tablet Take 1 tablet (20 mg) by mouth once daily for 10 days. 10 tablet 1     No current facility-administered medications for this visit.       Objective   Visit Vitals  BP (!) 182/92   Pulse 74   Ht 1.715 m (5' 7.5\")   Wt 115 kg (254 lb 6.1 oz)   SpO2 98%   BMI 39.25 kg/m²   Smoking Status Never   BSA 2.34 m²     Physical Exam  Vital signs as per nursing/MA documentation   General appearance: Alert and in no acute distress  HEENT: Normal Inspection   Neck: Normal Inspection   Respiratory: No respiratory distress Lungs are clear   Cardiovascular: Heart rate normal. No gallop  Back: Normal Inspection   Skin inspection: Warm   Musculoskeletal: No deformities. Bilateral pitting edema on legs. R side 2+, L side 1+.  Neuro: Limited exam. Baseline    Review of Systems   Comprehensive review of systems as allowed by patient condition and nursing input is negative    No visits with results within 4 Month(s) from this visit.   Latest known visit with results is:   Orders Only on 06/10/2024   Component Date Value Ref Range Status    [facility] MCV 06/10/2024 86.6  80.0 - 100.0 fL Final    [facility] MPV 06/10/2024 8.8  7.4 - 10.4 fL Final    [facility] HGB 06/10/2024 15.2  13.5 - 17.5 g/dL Final    [facility] WBC 06/10/2024 11.1 (H)  4.5 - 11.0 x10 Final    [facility] RDW 06/10/2024 14.4  11.5 - 14.5 % Final    [facility] MCH 06/10/2024 28.4  27.0 - 34.0 pg Final    [facility] Nucleated RBC 06/10/2024 0  /100WBC Final    [facility] HCT 06/10/2024 46.4  41.0 - 52.0 % Final    [facility] RBC 06/10/2024 5.36  4.70 - 6.10 x10 Final    [facility] Platelet 06/10/2024 307  150 - 450 x10 Final    [facility] Instr WBC 06/10/2024 11.1   Final    [facility] MCHC 06/10/2024 32.7  32.0 - 37.0 g/dL Final    [facility] DIFF? 06/10/2024 No   Final    [facility] Mono Count " 06/10/2024 0.87  0.10 - 1.00 x1000 Final    [facility] Neutrophil Count (ANC) 06/10/2024 6.57  1.40 - 8.80 x1000 Final    [facility] Neutrophil % 06/10/2024 59.2  % Final    [facility] Eosin % 06/10/2024 1.3  % Final    [facility] Eos Count 06/10/2024 0.15  0.00 - 0.50 x1000 Final    [facility] Lymph Count 06/10/2024 3.43  1.20 - 4.80 x1000 Final    [facility] Lymph % 06/10/2024 30.9  % Final    [facility] Baso Count 06/10/2024 0.08  0.00 - 0.20 x1000 Final    [facility] Basos % 06/10/2024 0.7  % Final    [facility] Mono % 06/10/2024 7.8  % Final    [facility] TSH 06/10/2024 4.45  0.55 - 4.78 uIU/ml Final    [facility] Na 06/10/2024 139  135 - 145 mmol/L Final    [facility] Glomerular Filtration R* 06/10/2024 41  mL/min/1.73m? Final    [facility] Calculated Osmolality 06/10/2024 285  275 - 295 mOsm/kg Final    [facility] Alk Phos 06/10/2024 179 (H)  45 - 117 unit/L Final    [facility] Globulin 06/10/2024 3.8  g/dL Final    [facility] Calcium 06/10/2024 10.0  8.7 - 10.4 mg/dL Final    [facility] GOT 06/10/2024 21  15 - 37 unit/L Final    [facility] Glucose 06/10/2024 169 (H)  74 - 106 mg/dL Final    [facility] K 06/10/2024 5.3 (H)  3.5 - 5.1 mmol/L Final    [facility] GFR AA 06/10/2024 50   Final    [facility] Bilirubin, Total 06/10/2024 0.40  0.30 - 1.20 mg/dL Final    [facility] BUN 06/10/2024 23  9 - 23 mg/dL Final    [facility] A/G Ratio 06/10/2024 1.1   Final    [facility] BUN/Creat Ratio 06/10/2024 14.4   Final    [facility] ALB 06/10/2024 4.0  3.4 - 5.0 g/dL Final    [facility] Creatinine 06/10/2024 1.6 (H)  0.6 - 1.1 mg/dL Final    [facility] GPT 06/10/2024 21  10 - 49 unit/L Final    [facility] Chloride 06/10/2024 105  98 - 107 mmol/L Final    [facility] Total Protein 06/10/2024 7.8  5.7 - 8.2 g/dL Final    [facility] CO2, venous 06/10/2024 26.0  20.0 - 31.0 mmol/L Final    [facility] HDL Cholesterol 06/10/2024 43  40 - 60 mg/dL Final    [facility] Total Chol/HDL Chol Rat* 06/10/2024 3.3   Final    [facility] Triglycerides 06/10/2024 363 (H)  30 - 150 mg/dL Final    [facility] Cholesterol 06/10/2024 143  100 - 200 mg/dL Final    [facility] Calculated LDL Choleste* 06/10/2024 27 (L)  60 - 130 mg/dL Final    [facility] HGB A1C 06/10/2024 7.8  % Final       Radiology: Reviewed imaging in powerchart.  No results found.    No family history on file.  Social History     Socioeconomic History    Marital status:    Tobacco Use    Smoking status: Never    Smokeless tobacco: Never   Vaping Use    Vaping status: Never Used   Substance and Sexual Activity    Alcohol use: Not Currently    Drug use: Never     Past Medical History:   Diagnosis Date    Other specified soft tissue disorders 05/04/2017    Nodule of soft tissue     No past surgical history on file.    Scribe Attestation  By signing my name below, I, Nba Adams MD , PA-S  attest that this documentation has been prepared under the direction and in the presence of Nba Adams MD.

## 2025-01-20 DIAGNOSIS — E13.69 OTHER SPECIFIED DIABETES MELLITUS WITH OTHER SPECIFIED COMPLICATION, UNSPECIFIED WHETHER LONG TERM INSULIN USE (MULTI): ICD-10-CM

## 2025-01-21 RX ORDER — INSULIN GLARGINE 100 [IU]/ML
55 INJECTION, SOLUTION SUBCUTANEOUS EVERY 24 HOURS
Qty: 54 ML | Refills: 3 | Status: SHIPPED | OUTPATIENT
Start: 2025-01-21

## 2025-06-03 LAB
NON-UH HIE A/G RATIO: 1.2
NON-UH HIE ALB: 4.2 G/DL (ref 3.4–5)
NON-UH HIE ALK PHOS: 135 UNIT/L (ref 45–117)
NON-UH HIE BASO COUNT: 0.08 X1000 (ref 0–0.2)
NON-UH HIE BASOS %: 0.6 %
NON-UH HIE BILIRUBIN, TOTAL: 0.5 MG/DL (ref 0.3–1.2)
NON-UH HIE BUN/CREAT RATIO: 15.3
NON-UH HIE BUN: 26 MG/DL (ref 9–23)
NON-UH HIE CALCIUM: 9.9 MG/DL (ref 8.7–10.4)
NON-UH HIE CALCULATED LDL CHOLESTEROL: 54 MG/DL (ref 60–130)
NON-UH HIE CALCULATED OSMOLALITY: 288 MOSM/KG (ref 275–295)
NON-UH HIE CHLORIDE: 109 MMOL/L (ref 98–107)
NON-UH HIE CHOLESTEROL: 137 MG/DL (ref 100–200)
NON-UH HIE CO2, VENOUS: 22 MMOL/L (ref 20–31)
NON-UH HIE CREATININE: 1.7 MG/DL (ref 0.6–1.1)
NON-UH HIE DIFF?: ABNORMAL
NON-UH HIE EOS COUNT: 0.16 X1000 (ref 0–0.5)
NON-UH HIE EOSIN %: 1.2 %
NON-UH HIE GFR AA: 47
NON-UH HIE GLOBULIN: 3.6 G/DL
NON-UH HIE GLOMERULAR FILTRATION RATE: 38 ML/MIN/1.73M?
NON-UH HIE GLUCOSE: 67 MG/DL (ref 74–106)
NON-UH HIE GOT: 22 UNIT/L (ref 15–37)
NON-UH HIE GPT: 19 UNIT/L (ref 10–49)
NON-UH HIE HCT: 43.6 % (ref 41–52)
NON-UH HIE HDL CHOLESTEROL: 44 MG/DL (ref 40–60)
NON-UH HIE HGB A1C: 7.3 %
NON-UH HIE HGB: 14.6 G/DL (ref 13.5–17.5)
NON-UH HIE INSTR WBC: 13
NON-UH HIE K: 3.8 MMOL/L (ref 3.5–5.1)
NON-UH HIE LYMPH %: 31.9 %
NON-UH HIE LYMPH COUNT: 4.16 X1000 (ref 1.2–4.8)
NON-UH HIE MCH: 29.9 PG (ref 27–34)
NON-UH HIE MCHC: 33.5 G/DL (ref 32–37)
NON-UH HIE MCV: 89.2 FL (ref 80–100)
NON-UH HIE MONO %: 8.8 %
NON-UH HIE MONO COUNT: 1.15 X1000 (ref 0.1–1)
NON-UH HIE MPV: 9.2 FL (ref 7.4–10.4)
NON-UH HIE NA: 143 MMOL/L (ref 135–145)
NON-UH HIE NEUTROPHIL %: 57.4 %
NON-UH HIE NEUTROPHIL COUNT (ANC): 7.48 X1000 (ref 1.4–8.8)
NON-UH HIE NUCLEATED RBC: 0 /100WBC
NON-UH HIE PLATELET: 321 X10 (ref 150–450)
NON-UH HIE RBC: 4.89 X10 (ref 4.7–6.1)
NON-UH HIE RDW: 13.9 % (ref 11.5–14.5)
NON-UH HIE TOTAL CHOL/HDL CHOL RATIO: 3.1
NON-UH HIE TOTAL PROTEIN: 7.8 G/DL (ref 5.7–8.2)
NON-UH HIE TRIGLYCERIDES: 193 MG/DL (ref 30–150)
NON-UH HIE TSH: 4.31 UIU/ML (ref 0.55–4.78)
NON-UH HIE WBC: 13 X10 (ref 4.5–11)

## 2025-06-09 ENCOUNTER — APPOINTMENT (OUTPATIENT)
Dept: PRIMARY CARE | Facility: CLINIC | Age: 85
End: 2025-06-09
Payer: MEDICARE

## 2025-06-09 VITALS
DIASTOLIC BLOOD PRESSURE: 76 MMHG | OXYGEN SATURATION: 98 % | HEIGHT: 68 IN | HEART RATE: 78 BPM | BODY MASS INDEX: 39.1 KG/M2 | WEIGHT: 258 LBS | SYSTOLIC BLOOD PRESSURE: 128 MMHG

## 2025-06-09 DIAGNOSIS — E11.40 TYPE 2 DIABETES MELLITUS WITH DIABETIC NEUROPATHY, WITHOUT LONG-TERM CURRENT USE OF INSULIN: ICD-10-CM

## 2025-06-09 DIAGNOSIS — E13.69 OTHER SPECIFIED DIABETES MELLITUS WITH OTHER SPECIFIED COMPLICATION, UNSPECIFIED WHETHER LONG TERM INSULIN USE (MULTI): ICD-10-CM

## 2025-06-09 DIAGNOSIS — I15.9 SECONDARY HYPERTENSION: ICD-10-CM

## 2025-06-09 DIAGNOSIS — E78.2 MIXED HYPERLIPIDEMIA: ICD-10-CM

## 2025-06-09 DIAGNOSIS — K21.9 GASTROESOPHAGEAL REFLUX DISEASE, UNSPECIFIED WHETHER ESOPHAGITIS PRESENT: ICD-10-CM

## 2025-06-09 PROCEDURE — 3078F DIAST BP <80 MM HG: CPT | Performed by: EMERGENCY MEDICINE

## 2025-06-09 PROCEDURE — G2211 COMPLEX E/M VISIT ADD ON: HCPCS | Performed by: EMERGENCY MEDICINE

## 2025-06-09 PROCEDURE — 1036F TOBACCO NON-USER: CPT | Performed by: EMERGENCY MEDICINE

## 2025-06-09 PROCEDURE — 3074F SYST BP LT 130 MM HG: CPT | Performed by: EMERGENCY MEDICINE

## 2025-06-09 PROCEDURE — 99214 OFFICE O/P EST MOD 30 MIN: CPT | Performed by: EMERGENCY MEDICINE

## 2025-06-09 PROCEDURE — 1159F MED LIST DOCD IN RCRD: CPT | Performed by: EMERGENCY MEDICINE

## 2025-06-09 RX ORDER — ATENOLOL 25 MG/1
25 TABLET ORAL DAILY
Qty: 90 TABLET | Refills: 3 | Status: SHIPPED | OUTPATIENT
Start: 2025-06-09

## 2025-06-09 RX ORDER — METFORMIN HYDROCHLORIDE 500 MG/1
500 TABLET, EXTENDED RELEASE ORAL
Qty: 270 TABLET | Refills: 3 | Status: SHIPPED | OUTPATIENT
Start: 2025-06-09 | End: 2026-06-09

## 2025-06-09 RX ORDER — ASPIRIN 81 MG/1
81 TABLET ORAL DAILY
Qty: 90 TABLET | Refills: 3 | Status: SHIPPED | OUTPATIENT
Start: 2025-06-09

## 2025-06-09 RX ORDER — LISINOPRIL 20 MG/1
20 TABLET ORAL DAILY
Qty: 90 TABLET | Refills: 3 | Status: SHIPPED | OUTPATIENT
Start: 2025-06-09

## 2025-06-09 RX ORDER — PANTOPRAZOLE SODIUM 40 MG/1
40 TABLET, DELAYED RELEASE ORAL
Qty: 90 TABLET | Refills: 3 | Status: SHIPPED | OUTPATIENT
Start: 2025-06-09

## 2025-06-09 RX ORDER — GLIMEPIRIDE 4 MG/1
4 TABLET ORAL
Qty: 90 TABLET | Refills: 3 | Status: SHIPPED | OUTPATIENT
Start: 2025-06-09

## 2025-06-09 RX ORDER — AMLODIPINE BESYLATE 10 MG/1
5 TABLET ORAL DAILY
Qty: 45 TABLET | Refills: 3 | Status: SHIPPED | OUTPATIENT
Start: 2025-06-09

## 2025-06-09 RX ORDER — INSULIN GLARGINE 100 [IU]/ML
55 INJECTION, SOLUTION SUBCUTANEOUS EVERY 24 HOURS
Qty: 54 ML | Refills: 3 | Status: SHIPPED | OUTPATIENT
Start: 2025-06-09 | End: 2026-06-09

## 2025-06-09 RX ORDER — SIMVASTATIN 20 MG/1
20 TABLET, FILM COATED ORAL NIGHTLY
Qty: 90 TABLET | Refills: 3 | Status: SHIPPED | OUTPATIENT
Start: 2025-06-09

## 2025-06-09 ASSESSMENT — PATIENT HEALTH QUESTIONNAIRE - PHQ9
2. FEELING DOWN, DEPRESSED OR HOPELESS: NOT AT ALL
SUM OF ALL RESPONSES TO PHQ9 QUESTIONS 1 AND 2: 0
1. LITTLE INTEREST OR PLEASURE IN DOING THINGS: NOT AT ALL

## 2025-06-09 ASSESSMENT — ENCOUNTER SYMPTOMS
LOSS OF SENSATION IN FEET: 0
DEPRESSION: 0
OCCASIONAL FEELINGS OF UNSTEADINESS: 0

## 2025-06-09 ASSESSMENT — COLUMBIA-SUICIDE SEVERITY RATING SCALE - C-SSRS
6. HAVE YOU EVER DONE ANYTHING, STARTED TO DO ANYTHING, OR PREPARED TO DO ANYTHING TO END YOUR LIFE?: NO
2. HAVE YOU ACTUALLY HAD ANY THOUGHTS OF KILLING YOURSELF?: NO
1. IN THE PAST MONTH, HAVE YOU WISHED YOU WERE DEAD OR WISHED YOU COULD GO TO SLEEP AND NOT WAKE UP?: NO

## 2025-06-09 NOTE — PROGRESS NOTES
Subjective   Patient ID: Narciso Dao is a 84 y.o. male who presents for Follow-up (Pt is here for F/U ).    Assessment/Plan   Problem List Items Addressed This Visit       Hyperlipidemia    Relevant Medications    simvastatin (Zocor) 20 mg tablet    aspirin 81 mg EC tablet    Other specified diabetes mellitus with other specified complication    Relevant Medications    insulin glargine (Lantus U-100 Insulin) 100 unit/mL injection    glimepiride (Amaryl) 4 mg tablet    metFORMIN  mg 24 hr tablet    aspirin 81 mg EC tablet    Gastroesophageal reflux disease    Relevant Medications    pantoprazole (ProtoNix) 40 mg EC tablet     Other Visit Diagnoses         Type 2 diabetes mellitus with diabetic neuropathy, without long-term current use of insulin        Relevant Medications    glimepiride (Amaryl) 4 mg tablet    metFORMIN  mg 24 hr tablet    aspirin 81 mg EC tablet      Secondary hypertension        Relevant Medications    atenolol (Tenormin) 25 mg tablet    lisinopril 20 mg tablet    amLODIPine (Norvasc) 10 mg tablet          Last Medicare wellness in January 2025    Diabetes type 2- controlled on metformin 500mg, glimepiride and insulin. Will continue the same and check A1c at next visit.  Recent hemoglobin A1c was 7.3 which is improved from before.  Continue the same medicine    Hypertension-controlled.  Continue current medication    Hyperlipidemia- Continue Zocor 20mg     Recent lab work showed couple of numbers which were mildly elevated-patient does not want to pursue any further testing or interventions at this time      Follow up in 6 months or sooner as needed     Source of history: Nurse, Medical personnel, Medical record, Patient.  History limitation: None.    Medicare wellness, physical and office visit    84 y.o. male   No acute complaints.     Past medical history includes diabetes type 2 with neuropathy, hypertension, hyperlipidemia, insomnia, and basal cell carcinoma.     No Known  "Allergies    Current Outpatient Medications   Medication Sig Dispense Refill    magnesium hydroxide (MAGNESIA ORAL) Take by mouth.      amLODIPine (Norvasc) 10 mg tablet Take 0.5 tablets (5 mg) by mouth once daily. 45 tablet 3    aspirin 81 mg EC tablet Take 1 tablet (81 mg) by mouth once daily. 90 tablet 3    atenolol (Tenormin) 25 mg tablet Take 1 tablet (25 mg) by mouth once daily. 90 tablet 3    glimepiride (Amaryl) 4 mg tablet Take 1 tablet (4 mg) by mouth once daily in the morning. Take before meals. 90 tablet 3    insulin glargine (Lantus U-100 Insulin) 100 unit/mL injection Inject 55 Units under the skin once every 24 hours. Inject 55-60 units daily 54 mL 3    lisinopril 20 mg tablet Take 1 tablet (20 mg) by mouth once daily. 90 tablet 3    metFORMIN  mg 24 hr tablet Take 1 tablet (500 mg) by mouth 2 times daily (morning and late afternoon). Do not crush, chew, or split. 270 tablet 3    pantoprazole (ProtoNix) 40 mg EC tablet Take 1 tablet (40 mg) by mouth once daily in the morning. Take before meals. Do not crush, chew, or split. 90 tablet 3    simvastatin (Zocor) 20 mg tablet Take 1 tablet (20 mg) by mouth once daily at bedtime. 90 tablet 3    spironolactone (Aldactone) 50 mg tablet Take 1 tablet (50 mg) by mouth once daily for 10 days. 10 tablet 1    torsemide (Demadex) 20 mg tablet Take 1 tablet (20 mg) by mouth once daily for 10 days. 10 tablet 1     No current facility-administered medications for this visit.       Objective   Visit Vitals  /76   Pulse 78   Ht 1.715 m (5' 7.5\")   Wt 117 kg (258 lb)   SpO2 98%   BMI 39.81 kg/m²   Smoking Status Never   BSA 2.36 m²     Physical Exam  Vital signs as per nursing/MA documentation   General appearance: Alert and in no acute distress  HEENT: Normal Inspection   Neck: Normal Inspection   Respiratory: No respiratory distress Lungs are clear   Cardiovascular: Heart rate normal. No gallop  Back: Normal Inspection   Skin inspection: Warm "   Musculoskeletal: No deformities. Bilateral pitting edema on legs. R side 2+, L side 1+.  Neuro: Limited exam. Baseline    Review of Systems   Comprehensive review of systems as allowed by patient condition and nursing input is negative    Orders Only on 06/03/2025   Component Date Value Ref Range Status    [facility] HGB 06/03/2025 14.6  13.5 - 17.5 g/dL Final    [facility] MPV 06/03/2025 9.2  7.4 - 10.4 fL Final    [facility] RDW 06/03/2025 13.9  11.5 - 14.5 % Final    [facility] WBC 06/03/2025 13.0 (H)  4.5 - 11.0 x10 Final    [facility] MCH 06/03/2025 29.9  27.0 - 34.0 pg Final    [facility] Nucleated RBC 06/03/2025 0  /100WBC Final    [facility] HCT 06/03/2025 43.6  41.0 - 52.0 % Final    [facility] Platelet 06/03/2025 321  150 - 450 x10 Final    [facility] RBC 06/03/2025 4.89  4.70 - 6.10 x10 Final    [facility] Instr WBC 06/03/2025 13.0   Final    [facility] MCHC 06/03/2025 33.5  32.0 - 37.0 g/dL Final    [facility] MCV 06/03/2025 89.2  80.0 - 100.0 fL Final    [facility] DIFF? 06/03/2025 No^NO   Final    [facility] Basos % 06/03/2025 0.6  % Final    [facility] Baso Count 06/03/2025 0.08  0.00 - 0.20 x1000 Final    [facility] Mono Count 06/03/2025 1.15 (H)  0.10 - 1.00 x1000 Final    [facility] Mono % 06/03/2025 8.8  % Final    [facility] Neutrophil % 06/03/2025 57.4  % Final    [facility] Neutrophil Count (ANC) 06/03/2025 7.48  1.40 - 8.80 x1000 Final    [facility] Eosin % 06/03/2025 1.2  % Final    [facility] Eos Count 06/03/2025 0.16  0.00 - 0.50 x1000 Final    [facility] Lymph Count 06/03/2025 4.16  1.20 - 4.80 x1000 Final    [facility] Lymph % 06/03/2025 31.9  % Final    [facility] Total Protein 06/03/2025 7.8  5.7 - 8.2 g/dL Final    [facility] CO2, venous 06/03/2025 22.0  20.0 - 31.0 mmol/L Final    [facility] Glomerular Filtration R* 06/03/2025 38  mL/min/1.73m? Final    [facility] Calculated Osmolality 06/03/2025 288  275 - 295 mOsm/kg Final    [facility] K 06/03/2025 3.8  3.5 - 5.1 mmol/L Final     [facility] Globulin 06/03/2025 3.6  g/dL Final    [facility] BUN 06/03/2025 26 (H)  9 - 23 mg/dL Final    [facility] Calcium 06/03/2025 9.9  8.7 - 10.4 mg/dL Final    [facility] GOT 06/03/2025 22  15 - 37 unit/L Final    [facility] ALB 06/03/2025 4.2  3.4 - 5.0 g/dL Final    [facility] Glucose 06/03/2025 67 (L)  74 - 106 mg/dL Final    [facility] GFR AA 06/03/2025 47   Final    [facility] Bilirubin, Total 06/03/2025 0.50  0.30 - 1.20 mg/dL Final    [facility] Chloride 06/03/2025 109 (H)  98 - 107 mmol/L Final    [facility] A/G Ratio 06/03/2025 1.2   Final    [facility] BUN/Creat Ratio 06/03/2025 15.3   Final    [facility] Na 06/03/2025 143  135 - 145 mmol/L Final    [facility] GPT 06/03/2025 19  10 - 49 unit/L Final    [facility] Alk Phos 06/03/2025 135 (H)  45 - 117 unit/L Final    [facility] Creatinine 06/03/2025 1.7 (H)  0.6 - 1.1 mg/dL Final    [facility] Total Chol/HDL Chol Rat* 06/03/2025 3.1   Final    [facility] Triglycerides 06/03/2025 193 (H)  30 - 150 mg/dL Final    [facility] Cholesterol 06/03/2025 137  100 - 200 mg/dL Final    [facility] Calculated LDL Choleste* 06/03/2025 54 (L)  60 - 130 mg/dL Final    [facility] HDL Cholesterol 06/03/2025 44  40 - 60 mg/dL Final    [facility] TSH 06/03/2025 4.31  0.55 - 4.78 uIU/ml Final    [facility] HGB A1C 06/03/2025 7.3  % Final       Radiology: Reviewed imaging in powerchart.  No results found.    No family history on file.  Social History     Socioeconomic History    Marital status:    Tobacco Use    Smoking status: Never    Smokeless tobacco: Never   Vaping Use    Vaping status: Never Used   Substance and Sexual Activity    Alcohol use: Not Currently    Drug use: Never     Past Medical History:   Diagnosis Date    Other specified soft tissue disorders 05/04/2017    Nodule of soft tissue     No past surgical history on file.    Scribe Attestation  By signing my name below, INba MD , PA-S  attest that this documentation has been prepared  under the direction and in the presence of Nba Adams MD.

## 2026-01-09 ENCOUNTER — APPOINTMENT (OUTPATIENT)
Dept: PRIMARY CARE | Facility: CLINIC | Age: 86
End: 2026-01-09
Payer: MEDICARE